# Patient Record
Sex: FEMALE | Race: WHITE | Employment: FULL TIME | URBAN - METROPOLITAN AREA
[De-identification: names, ages, dates, MRNs, and addresses within clinical notes are randomized per-mention and may not be internally consistent; named-entity substitution may affect disease eponyms.]

---

## 2018-01-09 ENCOUNTER — HOSPITAL ENCOUNTER (OUTPATIENT)
Age: 38
Discharge: HOME | End: 2018-01-09
Attending: EMERGENCY MEDICINE
Payer: COMMERCIAL

## 2018-01-09 VITALS
RESPIRATION RATE: 18 BRPM | SYSTOLIC BLOOD PRESSURE: 106 MMHG | HEART RATE: 96 BPM | TEMPERATURE: 98 F | WEIGHT: 145 LBS | OXYGEN SATURATION: 96 % | HEIGHT: 64 IN | DIASTOLIC BLOOD PRESSURE: 68 MMHG | BODY MASS INDEX: 24.75 KG/M2

## 2018-01-09 DIAGNOSIS — B96.89 ACUTE BACTERIAL TONSILLITIS: Primary | ICD-10-CM

## 2018-01-09 DIAGNOSIS — J03.80 ACUTE BACTERIAL TONSILLITIS: Primary | ICD-10-CM

## 2018-01-09 LAB — S PYO AG THROAT QL: POSITIVE

## 2018-01-09 PROCEDURE — 87430 STREP A AG IA: CPT

## 2018-01-09 PROCEDURE — 99203 OFFICE O/P NEW LOW 30 MIN: CPT

## 2018-01-09 PROCEDURE — 99202 OFFICE O/P NEW SF 15 MIN: CPT

## 2018-01-09 RX ORDER — AZITHROMYCIN 250 MG/1
TABLET, FILM COATED ORAL
Qty: 1 PACKAGE | Refills: 0 | Status: SHIPPED | OUTPATIENT
Start: 2018-01-09 | End: 2018-01-14

## 2018-01-09 NOTE — ED PROVIDER NOTES
Patient presents with:  Sore Throat      HPI:     Mami Mccarthy is a 40year old female who presents for evaluation of a chief complaint of sore throat.   The patient denies fever cough or vomiting    Possible risk factors for infection the patient states s visit:  The patient had positive rapid strep test  Diagnosis:    ICD-10-CM    1. Acute bacterial tonsillitis J03.80     B96.89        All results reviewed and discussed with patient. See AVS for detailed discharge instructions for your condition today.

## 2018-01-09 NOTE — ED INITIAL ASSESSMENT (HPI)
PATIENT ARRIVED AMBULATORY TO ROOM C/O A SORE THROAT X2 DAYS. PATIENT STATES \"IM PRONE TO STREP\" PT DENIES FEVERS. DENIES N/V/D. DENIES COUGH. DENIES NASAL CONGESTION. EASY NON LABORED RESPIRATIONS.

## 2018-11-29 ENCOUNTER — HOSPITAL (OUTPATIENT)
Dept: OTHER | Age: 38
End: 2018-11-29
Attending: SPECIALIST

## 2018-11-30 ENCOUNTER — CHARTING TRANS (OUTPATIENT)
Dept: OTHER | Age: 38
End: 2018-11-30

## 2019-02-26 ENCOUNTER — HOSPITAL ENCOUNTER (OUTPATIENT)
Age: 39
Discharge: HOME | End: 2019-02-26
Attending: FAMILY MEDICINE
Payer: COMMERCIAL

## 2019-02-26 VITALS
DIASTOLIC BLOOD PRESSURE: 69 MMHG | SYSTOLIC BLOOD PRESSURE: 116 MMHG | TEMPERATURE: 99 F | RESPIRATION RATE: 18 BRPM | WEIGHT: 150 LBS | HEART RATE: 99 BPM | BODY MASS INDEX: 25.61 KG/M2 | HEIGHT: 64 IN | OXYGEN SATURATION: 100 %

## 2019-02-26 DIAGNOSIS — J06.9 ACUTE URI: Primary | ICD-10-CM

## 2019-02-26 LAB — S PYO AG THROAT QL: NEGATIVE

## 2019-02-26 PROCEDURE — 99212 OFFICE O/P EST SF 10 MIN: CPT

## 2019-02-26 PROCEDURE — 99213 OFFICE O/P EST LOW 20 MIN: CPT

## 2019-02-26 PROCEDURE — 87430 STREP A AG IA: CPT

## 2019-02-26 RX ORDER — ESCITALOPRAM OXALATE 20 MG/1
20 TABLET ORAL DAILY
COMMUNITY

## 2019-02-27 NOTE — ED PROVIDER NOTES
Patient presents with:  Sore Throat  Fever      HPI:     Starla Wood is a 46year old female who presents with for chief complaint of low grade fever, mild cough X 1 days.     The patient denies complaints of  neck pain, ear pain, difficulty breathing, ab masses,  no organomegaly  Skin: Skin color, texture, turgor normal. No rashes or lesions      Assessment/Plan:     Labs performed this visit:  Recent Results (from the past 10 hour(s))   [facility] POCT RAPID STREP    Collection Time: 02/26/19  7:03 PM   Result V

## 2019-02-27 NOTE — ED INITIAL ASSESSMENT (HPI)
Pt to IC with sore throat and low grade fever for 3 days. Son currently being treated for strep throat.

## 2021-11-20 ENCOUNTER — HOSPITAL ENCOUNTER (OUTPATIENT)
Age: 41
Discharge: HOME | End: 2021-11-20
Payer: COMMERCIAL

## 2021-11-20 VITALS
BODY MASS INDEX: 25.27 KG/M2 | DIASTOLIC BLOOD PRESSURE: 71 MMHG | SYSTOLIC BLOOD PRESSURE: 120 MMHG | WEIGHT: 148 LBS | RESPIRATION RATE: 18 BRPM | TEMPERATURE: 98 F | HEART RATE: 96 BPM | OXYGEN SATURATION: 97 % | HEIGHT: 64 IN

## 2021-11-20 DIAGNOSIS — U07.1 COVID: Primary | ICD-10-CM

## 2021-11-20 PROCEDURE — 87880 STREP A ASSAY W/OPTIC: CPT

## 2021-11-20 PROCEDURE — 99213 OFFICE O/P EST LOW 20 MIN: CPT

## 2021-11-20 NOTE — ED PROVIDER NOTES
Patient Seen in: [facility]      History   Patient presents with:  Cough/URI    Stated Complaint: COVID TEST    Subjective:   HPI    38 yo female vaccinated for covid arrives to the ic with co low grade fevers and nasal congestion for 2 days, Tympanic membrane is injected. Nose: Congestion and rhinorrhea present. Mouth/Throat:      Mouth: Mucous membranes are moist.      Pharynx: Uvula midline. Posterior oropharyngeal erythema present. No oropharyngeal exudate or uvula swelling.    Eye diagnoses, expectations, follow up, and ER precautions. I explained that emergent conditions may arise and to go to the ER for new, worsening or any persistent conditions. I've explained the importance of following up with their doctor as instructed.  The magnus PM

## 2025-07-15 DIAGNOSIS — N88.8 CERVICAL MASS: Primary | ICD-10-CM

## 2025-07-29 ENCOUNTER — HOSPITAL ENCOUNTER (OUTPATIENT)
Dept: MRI IMAGING | Age: 45
Discharge: HOME | End: 2025-07-29
Attending: SPECIALIST

## 2025-07-29 DIAGNOSIS — N88.8 CERVICAL MASS: ICD-10-CM

## 2025-07-29 PROCEDURE — 72197 MRI PELVIS W/O & W/DYE: CPT

## 2025-07-29 PROCEDURE — 10002805 HB CONTRAST AGENT: Performed by: SPECIALIST

## 2025-07-29 PROCEDURE — A9585 GADOBUTROL INJECTION: HCPCS | Performed by: SPECIALIST

## 2025-07-29 RX ORDER — GADOBUTROL 604.72 MG/ML
7.5 INJECTION INTRAVENOUS ONCE
Status: COMPLETED | OUTPATIENT
Start: 2025-07-29 | End: 2025-07-29

## 2025-07-29 RX ADMIN — GADOBUTROL 7.5 ML: 604.72 INJECTION INTRAVENOUS at 10:39
